# Patient Record
Sex: MALE | HISPANIC OR LATINO | Employment: FULL TIME | URBAN - METROPOLITAN AREA
[De-identification: names, ages, dates, MRNs, and addresses within clinical notes are randomized per-mention and may not be internally consistent; named-entity substitution may affect disease eponyms.]

---

## 2017-08-14 ENCOUNTER — HOSPITAL ENCOUNTER (EMERGENCY)
Facility: MEDICAL CENTER | Age: 37
End: 2017-08-14
Attending: EMERGENCY MEDICINE
Payer: COMMERCIAL

## 2017-08-14 VITALS
RESPIRATION RATE: 16 BRPM | DIASTOLIC BLOOD PRESSURE: 68 MMHG | SYSTOLIC BLOOD PRESSURE: 132 MMHG | OXYGEN SATURATION: 96 % | WEIGHT: 216.05 LBS | HEIGHT: 72 IN | TEMPERATURE: 96.8 F | HEART RATE: 78 BPM | BODY MASS INDEX: 29.26 KG/M2

## 2017-08-14 DIAGNOSIS — R42 LIGHT HEADEDNESS: ICD-10-CM

## 2017-08-14 LAB
ALBUMIN SERPL BCP-MCNC: 4.6 G/DL (ref 3.2–4.9)
ALBUMIN/GLOB SERPL: 1.3 G/DL
ALP SERPL-CCNC: 78 U/L (ref 30–99)
ALT SERPL-CCNC: 25 U/L (ref 2–50)
ANION GAP SERPL CALC-SCNC: 10 MMOL/L (ref 0–11.9)
AST SERPL-CCNC: 18 U/L (ref 12–45)
BASOPHILS # BLD AUTO: 0.3 % (ref 0–1.8)
BASOPHILS # BLD: 0.03 K/UL (ref 0–0.12)
BILIRUB SERPL-MCNC: 0.9 MG/DL (ref 0.1–1.5)
BUN SERPL-MCNC: 12 MG/DL (ref 8–22)
CALCIUM SERPL-MCNC: 9.8 MG/DL (ref 8.5–10.5)
CHLORIDE SERPL-SCNC: 105 MMOL/L (ref 96–112)
CO2 SERPL-SCNC: 23 MMOL/L (ref 20–33)
CREAT SERPL-MCNC: 0.98 MG/DL (ref 0.5–1.4)
EOSINOPHIL # BLD AUTO: 0.02 K/UL (ref 0–0.51)
EOSINOPHIL NFR BLD: 0.2 % (ref 0–6.9)
ERYTHROCYTE [DISTWIDTH] IN BLOOD BY AUTOMATED COUNT: 37.4 FL (ref 35.9–50)
GFR SERPL CREATININE-BSD FRML MDRD: >60 ML/MIN/1.73 M 2
GLOBULIN SER CALC-MCNC: 3.5 G/DL (ref 1.9–3.5)
GLUCOSE SERPL-MCNC: 97 MG/DL (ref 65–99)
HCT VFR BLD AUTO: 48.1 % (ref 42–52)
HGB BLD-MCNC: 16.8 G/DL (ref 14–18)
IMM GRANULOCYTES # BLD AUTO: 0.05 K/UL (ref 0–0.11)
IMM GRANULOCYTES NFR BLD AUTO: 0.4 % (ref 0–0.9)
LYMPHOCYTES # BLD AUTO: 3.18 K/UL (ref 1–4.8)
LYMPHOCYTES NFR BLD: 28 % (ref 22–41)
MCH RBC QN AUTO: 28 PG (ref 27–33)
MCHC RBC AUTO-ENTMCNC: 34.9 G/DL (ref 33.7–35.3)
MCV RBC AUTO: 80.3 FL (ref 81.4–97.8)
MONOCYTES # BLD AUTO: 0.77 K/UL (ref 0–0.85)
MONOCYTES NFR BLD AUTO: 6.8 % (ref 0–13.4)
NEUTROPHILS # BLD AUTO: 7.32 K/UL (ref 1.82–7.42)
NEUTROPHILS NFR BLD: 64.3 % (ref 44–72)
NRBC # BLD AUTO: 0 K/UL
NRBC BLD AUTO-RTO: 0 /100 WBC
PLATELET # BLD AUTO: 369 K/UL (ref 164–446)
PMV BLD AUTO: 9.8 FL (ref 9–12.9)
POTASSIUM SERPL-SCNC: 3.5 MMOL/L (ref 3.6–5.5)
PROT SERPL-MCNC: 8.1 G/DL (ref 6–8.2)
RBC # BLD AUTO: 5.99 M/UL (ref 4.7–6.1)
SODIUM SERPL-SCNC: 138 MMOL/L (ref 135–145)
TSH SERPL DL<=0.005 MIU/L-ACNC: 2.58 UIU/ML (ref 0.3–3.7)
WBC # BLD AUTO: 11.4 K/UL (ref 4.8–10.8)

## 2017-08-14 PROCEDURE — 84443 ASSAY THYROID STIM HORMONE: CPT

## 2017-08-14 PROCEDURE — 85025 COMPLETE CBC W/AUTO DIFF WBC: CPT

## 2017-08-14 PROCEDURE — 99283 EMERGENCY DEPT VISIT LOW MDM: CPT

## 2017-08-14 PROCEDURE — 80053 COMPREHEN METABOLIC PANEL: CPT

## 2017-08-14 ASSESSMENT — ENCOUNTER SYMPTOMS
EYES NEGATIVE: 1
CARDIOVASCULAR NEGATIVE: 1
DIZZINESS: 1
WEAKNESS: 1

## 2017-08-14 ASSESSMENT — PAIN SCALES - GENERAL: PAINLEVEL_OUTOF10: 2

## 2017-08-14 NOTE — ED AVS SNAPSHOT
8/14/2017    Florin Cox  [address] [address]    Dear Florin:    [facility] wants to ensure your discharge home is safe and you or your loved ones have had all of your questions answered regarding your care after you leave the hospital.    Below is a list of resources and contact information should you have any questions regarding your hospital stay, follow-up instructions, or active medical symptoms.    Questions or Concerns Regarding… Contact   Medical Questions Related to Your Discharge  (7 days a week, 8am-5pm) Contact a Nurse Care Coordinator   [phone]   Medical Questions Not Related to Your Discharge  (24 hours a day / 7 days a week)  Contact the Nurse Health Line   [phone]    Medications or Discharge Instructions Refer to your discharge packet   or contact your [facility] Primary Care Provider   [phone]   Follow-up Appointment(s) Schedule your appointment via Fjuul   or contact Scheduling [phone]   Billing Review your statement via Fjuul  or contact Billing [phone]   Medical Records Review your records via Fjuul   or contact Medical Records [phone]     You may receive a telephone call within two days of discharge. This call is to make certain you understand your discharge instructions and have the opportunity to have any questions answered. You can also easily access your medical information, test results and upcoming appointments via the Fjuul free online health management tool. You can learn more and sign up at [employer]/Fjuul. For assistance setting up your Fjuul account, please call [phone].    Once again, we want to ensure your discharge home is safe and that you have a clear understanding of any next steps in your care. If you have any questions or concerns, please do not hesitate to contact us, we are here for you. Thank you for choosing [facility] for your healthcare needs.    Sincerely,    Your [facility] Healthcare Team

## 2017-08-14 NOTE — ED AVS SNAPSHOT
PedidosYa / PedidosJÃ¡ Access Code: [email or web address]  Expires: 9/13/2017  8:54 PM    Your email address is not on file at [facility].  Email Addresses are required for you to sign up for PedidosYa / PedidosJÃ¡, please contact [phone] to verify your personal information and to provide your email address prior to attempting to register for PedidosYa / PedidosJÃ¡.    Florin Zhaomargaret Cox  [address] [place] [address]    PedidosYa / PedidosJÃ¡  A secure, online tool to manage your health information     [facility]’s PedidosYa / PedidosJÃ¡® is a secure, online tool that connects you to your personalized health information from the privacy of your home -- day or night - making it very easy for you to manage your healthcare. Once the activation process is completed, you can even access your medical information using the PedidosYa / PedidosJÃ¡ erick, which is available for free in the Apple Erick store or Google Play store.     To learn more about PedidosYa / PedidosJÃ¡, visit [email or web address]    There are two levels of access available (as shown below):   My Chart Features  [facility] [place] Primary Care Doctor [facility]  Specialists [facility]  Urgent  Care Non-[facility] Primary Care Doctor   Email your healthcare team securely and privately 24/7 X X X    Manage appointments: schedule your next appointment; view details of past/upcoming appointments X      Request prescription refills. X      View recent personal medical records, including lab and immunizations X X X X   View health record, including health history, allergies, medications X X X X   Read reports about your outpatient visits, procedures, consult and ER notes X X X X   See your discharge summary, which is a recap of your hospital and/or ER visit that includes your diagnosis, lab results, and care plan X X  X     How to register for PedidosYa / PedidosJÃ¡:  Once your e-mail address has been verified, follow the following steps to sign up for PedidosYa / PedidosJÃ¡.     1. Go to  [email or web address]  2. Click on the Sign Up Now box, which takes you to the New Member Sign Up page. You  will need to provide the following information:  a. Enter your Hoseanna Access Code exactly as it appears at the top of this page. (You will not need to use this code after you’ve completed the sign-up process. If you do not sign up before the expiration date, you must request a new code.)   b. Enter your date of birth.   c. Enter your home email address.   d. Click Submit, and follow the next screen’s instructions.  3. Create a Quantifindt ID. This will be your Hoseanna login ID and cannot be changed, so think of one that is secure and easy to remember.  4. Create a Hoseanna password. You can change your password at any time.  5. Enter your Password Reset Question and Answer. This can be used at a later time if you forget your password.   6. Enter your e-mail address. This allows you to receive e-mail notifications when new information is available in Hoseanna.  7. Click Sign Up. You can now view your health information.    For assistance activating your Hoseanna account, call [phone]

## 2017-08-14 NOTE — ED AVS SNAPSHOT
Home Care Instructions                                                                                                                Florin Cox   MRN: [id number]    Department:  [facility], Emergency Dept   Date of Visit:  8/14/2017            [facility], Emergency Dept    [address]    [place] [address]    Phone:  [phone]      You were seen by     Tom Brady M.D.      Your Diagnosis Was     Light headedness     R42       Follow-up Information     1. Schedule an appointment as soon as possible for a visit with Your primary care doctor.      Medication Information     Review all of your home medications and newly ordered medications with your primary doctor and/or pharmacist as soon as possible. Follow medication instructions as directed by your doctor and/or pharmacist.     Please keep your complete medication list with you and share with your physician. Update the information when medications are discontinued, doses are changed, or new medications (including over-the-counter products) are added; and carry medication information at all times in the event of emergency situations.               Medication List      ASK your doctor about these medications        Instructions    Morning Afternoon Evening Bedtime    oxycodone-acetaminophen 7.5-325 MG per tablet   Commonly known as:  PERCOCET        Take 1 Tab by mouth every four hours as needed.   Dose:  1 Tab                                Procedures and tests performed during your visit     CBC WITH DIFFERENTIAL    COMP METABOLIC PANEL    ESTIMATED GFR    TSH        Discharge Instructions       Your labs are normal. Currently I believe her symptoms may be from low blood sugar in the morning. Please start him morning with a healthy meal. Her symptoms continue to persist despite this intervention please follow up with her primary care doctor for ongoing workup.          Dizziness  Dizziness is a common  problem. It makes you feel unsteady or lightheaded. You may feel like you are about to pass out (faint). Dizziness can lead to injury if you stumble or fall. Anyone can get dizzy, but dizziness is more common in older adults. This condition can be caused by a number of things, including:  · Medicines.  · Dehydration.  · Illness.  HOME CARE  Following these instructions may help with your condition:  Eating and Drinking  · Drink enough fluid to keep your pee (urine) clear or pale yellow. This helps to keep you from getting dehydrated. Try to drink more clear fluids, such as water.  · Do not drink alcohol.  · Limit how much caffeine you drink or eat if told by your doctor.  · Limit how much salt you drink or eat if told by your doctor.  Activity  · Avoid making quick movements.  ¨ When you stand up from sitting in a chair, steady yourself until you feel okay.  ¨ In the morning, first sit up on the side of the bed. When you feel okay, stand slowly while you hold onto something. Do this until you know that your balance is fine.  · Move your legs often if you need to  one place for a long time. Tighten and relax your muscles in your legs while you are standing.  · Do not drive or use heavy machinery if you feel dizzy.  · Avoid bending down if you feel dizzy. Place items in your home so that they are easy for you to reach without leaning over.  Lifestyle  · Do not use any tobacco products, including cigarettes, chewing tobacco, or electronic cigarettes. If you need help quitting, ask your doctor.  · Try to lower your stress level, such as with yoga or meditation. Talk with your doctor if you need help.  General Instructions  · Watch your dizziness for any changes.  · Take medicines only as told by your doctor. Talk with your doctor if you think that your dizziness is caused by a medicine that you are taking.  · Tell a friend or a family member that you are feeling dizzy. If he or she notices any changes in your  behavior, have this person call your doctor.  · Keep all follow-up visits as told by your doctor. This is important.  GET HELP IF:  · Your dizziness does not go away.  · Your dizziness or light-headedness gets worse.  · You feel sick to your stomach (nauseous).  · You have trouble hearing.  · You have new symptoms.  · You are unsteady on your feet or you feel like the room is spinning.  GET HELP RIGHT AWAY IF:  · You throw up (vomit) or have diarrhea and are unable to eat or drink anything.  · You have trouble:  ¨ Talking.  ¨ Walking.  ¨ Swallowing.  ¨ Using your arms, hands, or legs.  · You feel generally weak.  · You are not thinking clearly or you have trouble forming sentences. It may take a friend or family member to notice this.  · You have:  ¨ Chest pain.  ¨ Pain in your belly (abdomen).  ¨ Shortness of breath.  ¨ Sweating.  · Your vision changes.  · You are bleeding.  · You have a headache.  · You have neck pain or a stiff neck.  · You have a fever.     This information is not intended to replace advice given to you by your health care provider. Make sure you discuss any questions you have with your health care provider.     Document Released: 12/06/2012 Document Revised: 05/03/2016 Document Reviewed: 12/14/2015  [employer] Interactive Patient Education ©2016 [employer]            Patient Information     Patient Information    Following emergency treatment: all patient requiring follow-up care must return either to a private physician or a clinic if your condition worsens before you are able to obtain further medical attention, please return to the emergency room.     Billing Information    At [facility], we work to make the billing process streamlined for our patients.  Our Representatives are here to answer any questions you may have regarding your hospital bill.  If you have insurance coverage and have supplied your insurance information to us, we will submit a claim to your insurer on your behalf.   Should you have any questions regarding your bill, we can be reached online or by phone as follows:  Online: You are able pay your bills online or live chat with our representatives about any billing questions you may have. We are here to help Monday - Friday from 8:00am to 7:30pm and 9:00am - 12:00pm on Saturdays.  Please visit [email or web address]  for more information.   Phone:  [phone] or [phone]    Please note that your emergency physician, surgeon, pathologist, radiologist, anesthesiologist, and other specialists are not employed by [facility] and will therefore bill separately for their services.  Please contact them directly for any questions concerning their bills at the numbers below:     Emergency Physician Services:  [phone]  [facility]:  [phone]  [facility]:  [phone]  [facility]:  [phone]    1. Your final bill may vary from the amount quoted upon discharge if all procedures are not complete at that time, or if your doctor has additional procedures of which we are not aware. You will receive an additional bill if you return to the Emergency Department at [facility] for suture removal regardless of the facility of which the sutures were placed.     2. Please arrange for settlement of this account at the emergency registration.    3. All self-pay accounts are due in full at the time of treatment.  If you are unable to meet this obligation then payment is expected within 4-5 days.     4. If you have had radiology studies (CT, X-ray, Ultrasound, MRI), you have received a preliminary result during your emergency department visit. Please contact the radiology department [phone] to receive a copy of your final result. Please discuss the Final result with your primary physician or with the follow up physician provided.     Crisis Hotline:  National Crisis Hotline:  3-987-JAYYVHD or  1-527.784.5736  [place] Crisis Hotline:    1-916.292.5459 or 515-863-6342         ED Discharge Follow Up Questions    1. In order to provide you with very good care, we would like to follow up with a phone call in the next few days.  May we have your permission to contact you?     YES /  NO    2. What is the best phone number to call you? (       )_____-__________    3. What is the best time to call you?      Morning  /  Afternoon  /  Evening                   Patient Signature:  ____________________________________________________________    Date:  ____________________________________________________________

## 2017-08-15 NOTE — ED PROVIDER NOTES
"ED Provider Note    Scribed for Tom Brady M.D. by Korina Wetzel. 8/14/2017  7:38 PM    Means of arrival: Walkin  History obtained from: patient  Limitations: none    CHIEF COMPLAINT  Chief Complaint   Patient presents with   • Dizziness     \"I feel like the room is spinning.\"   • Weakness     worse in the morning when the dizziness starts, gets better after the day goes on       HPI  Florin Cox is a 36 y.o. male who presents for lightheadedness. Patient reports that he has felt lightheaded for the last few months. Patient reports that this is worse in the mornings and usually baseline today. Patient reports that the duration of his lightheadedness has increased recently to a couple hours. Patient denies any difficulty ambulating. Patient denies any feelings of the room spinning or feeling like he is drunk. Patient denies any sensation of feeling like he is going to pass out. Patient denies any associated chest pain shortness of breath or decreased exertional capacity. Patient reports he can walk as far as like without any provocation of symptoms. Patient reports he does not eat any breakfast and usually the symptoms leanne throughout the day as he eats. Patient denies any headaches changes in vision. Patient does report he is having a little bit more hair fall out in the shower recently. Denies any heat or cold intolerance. Patient denies any abdominal pain. Patient does report some mild nausea upon waking which resolves shortly thereafter. Patient denies any focal weakness or numbness but does report when he is feeling this lightheadedness but he does feel diffusely \"out of it\" or weak. He reports he can do his job without any difficulty throughout the duration of his symptoms.    REVIEW OF SYSTEMS  Review of Systems   HENT: Negative.    Eyes: Negative.    Cardiovascular: Negative.    Skin: Negative.    Neurological: Positive for dizziness and weakness.   See HPI for further details.  E    PAST " "MEDICAL HISTORY  None    SOCIAL HISTORY  Social History     Social History Main Topics   • Smoking status: Never Smoker    • Alcohol Use: No   • Drug Use: No       SURGICAL HISTORY   has past surgical history that includes other abdominal surgery.    CURRENT MEDICATIONS  None    ALLERGIES  No Known Allergies    PHYSICAL EXAM  /72 mmHg  Pulse 83  Temp(Src) 36 °C (96.8 °F)  Resp 16  Ht 1.829 m (6' 0.01\")  Wt 98 kg (216 lb 0.8 oz)  BMI 29.30 kg/m2  SpO2 96%  Constitutional: Well developed, Well nourished, No acute distress, Non-toxic appearance.   HENT: Normocephalic, Atraumatic,  Eyes: EOM intact, PERRL  Neck: normal ROM  Cardiovascular: Regular rate and rhythm  Lungs: Clear to auscultation bilaterally, easy unlabored respirations   Abdomen: Bowel sounds normal, Soft, No tenderness  Skin: Warm, Dry, no rash  Extremities: No edema to lower extremities  Neurologic: Alert and oriented, appropriate, follows commands, moving all extremities, normal speech. Patient with normal gait. Patient with sensation intact. Patient with Romberg's negative. Patient without any dysmetria or dysdiadochokinesia. Patient without any nystagmus.  Psychiatric: Affect normal    DIAGNOSTIC STUDIES/PROCEDURES  LABS  No results found for this or any previous visit.    COURSE & MEDICAL DECISION MAKING  Pertinent Labs & Imaging studies reviewed. (See chart for details)    7:38 PM Patient seen and examined at bedside. The patient presents with vague light headedness. Basic labs ordered to evaluate.     Decision Making:  The patient will return for new or worsening symptoms and is stable at the time of discharge.  This well-appearing patient presents with vague lightheadedness that abates throughout the day after he eats. Such symptoms would be consistent with mild preprandial hypoglycemia. Patient has entirely normal neurologic exam, however I have discussed the patient that if workup today is normal and symptoms do not improve with " introducing an early meal that he should follow-up with primary care doctor for ongoing evaluation. Patient without any associated chest pain, shortness of breath, decreased exertional capacity, near syncope or any other symptoms to suggest ACS or cardiopulmonary sxs    The patient is referred to a primary physician for blood pressure management, diabetic screening, and for all other preventative health concerns.    Minimally decreased potassium. I discussed patient to take a daily vitamin and start the day with a healthy breakfast. He understands to return to the emergency department or to his primary care doctor if this intervention does not quell his symptoms.    DISPOSITION:  Patient will be discharged home in stable condition.    FOLLOW UP:  No follow-up provider specified.    OUTPATIENT MEDICATIONS:  New Prescriptions    No medications on file     FINAL IMPRESSION  No diagnosis found.     Korina SHARIF (Catie), am scribing for, and in the presence of, Tom Brady M.D.    Electronically signed by: Korina Wetzel (Catie), 8/14/2017    Tom SHARIF M.D. personally performed the services described in this documentation, as scribed by Korina Wetzel in my presence, and it is both accurate and complete.    The note accurately reflects work and decisions made by me.  Tom Brady  8/14/2017  8:54 PM

## 2017-08-15 NOTE — ED NOTES
"Pt amb to triage.  Chief Complaint   Patient presents with   • Dizziness     \"I feel like the room is spinning.\"   • Weakness     worse in the morning when the dizziness starts, gets better after the day goes on     Symptoms xmonths.  "

## 2017-08-15 NOTE — DISCHARGE PLANNING
According to CONNIE arcos he is not planning on admitting pt ([employer] insurance). ERCM spoke with pt and told him that with his [employer] insurance his hospital of choice is [facility], especially if he were to be an admit his copays/deductible could be more than expected, pt acknowledged info given.

## 2017-08-15 NOTE — DISCHARGE INSTRUCTIONS
Your labs are normal. Currently I believe her symptoms may be from low blood sugar in the morning. Please start him morning with a healthy meal. Her symptoms continue to persist despite this intervention please follow up with her primary care doctor for ongoing workup.      Please follow up with a primary physician for blood pressure management, diabetic screening, and all other preventive health concerns.       Dizziness  Dizziness is a common problem. It makes you feel unsteady or lightheaded. You may feel like you are about to pass out (faint). Dizziness can lead to injury if you stumble or fall. Anyone can get dizzy, but dizziness is more common in older adults. This condition can be caused by a number of things, including:  · Medicines.  · Dehydration.  · Illness.  HOME CARE  Following these instructions may help with your condition:  Eating and Drinking  · Drink enough fluid to keep your pee (urine) clear or pale yellow. This helps to keep you from getting dehydrated. Try to drink more clear fluids, such as water.  · Do not drink alcohol.  · Limit how much caffeine you drink or eat if told by your doctor.  · Limit how much salt you drink or eat if told by your doctor.  Activity  · Avoid making quick movements.  ¨ When you stand up from sitting in a chair, steady yourself until you feel okay.  ¨ In the morning, first sit up on the side of the bed. When you feel okay, stand slowly while you hold onto something. Do this until you know that your balance is fine.  · Move your legs often if you need to  one place for a long time. Tighten and relax your muscles in your legs while you are standing.  · Do not drive or use heavy machinery if you feel dizzy.  · Avoid bending down if you feel dizzy. Place items in your home so that they are easy for you to reach without leaning over.  Lifestyle  · Do not use any tobacco products, including cigarettes, chewing tobacco, or electronic cigarettes. If you need help  quitting, ask your doctor.  · Try to lower your stress level, such as with yoga or meditation. Talk with your doctor if you need help.  General Instructions  · Watch your dizziness for any changes.  · Take medicines only as told by your doctor. Talk with your doctor if you think that your dizziness is caused by a medicine that you are taking.  · Tell a friend or a family member that you are feeling dizzy. If he or she notices any changes in your behavior, have this person call your doctor.  · Keep all follow-up visits as told by your doctor. This is important.  GET HELP IF:  · Your dizziness does not go away.  · Your dizziness or light-headedness gets worse.  · You feel sick to your stomach (nauseous).  · You have trouble hearing.  · You have new symptoms.  · You are unsteady on your feet or you feel like the room is spinning.  GET HELP RIGHT AWAY IF:  · You throw up (vomit) or have diarrhea and are unable to eat or drink anything.  · You have trouble:  ¨ Talking.  ¨ Walking.  ¨ Swallowing.  ¨ Using your arms, hands, or legs.  · You feel generally weak.  · You are not thinking clearly or you have trouble forming sentences. It may take a friend or family member to notice this.  · You have:  ¨ Chest pain.  ¨ Pain in your belly (abdomen).  ¨ Shortness of breath.  ¨ Sweating.  · Your vision changes.  · You are bleeding.  · You have a headache.  · You have neck pain or a stiff neck.  · You have a fever.     This information is not intended to replace advice given to you by your health care provider. Make sure you discuss any questions you have with your health care provider.     Document Released: 12/06/2012 Document Revised: 05/03/2016 Document Reviewed: 12/14/2015  [employer] Interactive Patient Education ©2016 [employer]

## 2021-09-05 ENCOUNTER — APPOINTMENT (OUTPATIENT)
Dept: RADIOLOGY | Facility: MEDICAL CENTER | Age: 41
DRG: 177 | End: 2021-09-05
Attending: EMERGENCY MEDICINE
Payer: MEDICAID

## 2021-09-05 ENCOUNTER — HOSPITAL ENCOUNTER (INPATIENT)
Facility: MEDICAL CENTER | Age: 41
LOS: 2 days | DRG: 177 | End: 2021-09-07
Attending: EMERGENCY MEDICINE | Admitting: STUDENT IN AN ORGANIZED HEALTH CARE EDUCATION/TRAINING PROGRAM
Payer: MEDICAID

## 2021-09-05 DIAGNOSIS — U07.1 PNEUMONIA DUE TO COVID-19 VIRUS: ICD-10-CM

## 2021-09-05 DIAGNOSIS — U07.1 COVID-19: ICD-10-CM

## 2021-09-05 DIAGNOSIS — R09.02 HYPOXIA: ICD-10-CM

## 2021-09-05 DIAGNOSIS — J12.82 PNEUMONIA DUE TO COVID-19 VIRUS: ICD-10-CM

## 2021-09-05 LAB
ALBUMIN SERPL BCP-MCNC: 4.1 G/DL (ref 3.2–4.9)
ALBUMIN/GLOB SERPL: 1 G/DL
ALP SERPL-CCNC: 151 U/L (ref 30–99)
ALT SERPL-CCNC: 30 U/L (ref 2–50)
ANION GAP SERPL CALC-SCNC: 16 MMOL/L (ref 7–16)
AST SERPL-CCNC: 37 U/L (ref 12–45)
BASOPHILS # BLD AUTO: 0.1 % (ref 0–1.8)
BASOPHILS # BLD: 0.01 K/UL (ref 0–0.12)
BILIRUB SERPL-MCNC: 0.4 MG/DL (ref 0.1–1.5)
BUN SERPL-MCNC: 11 MG/DL (ref 8–22)
CALCIUM SERPL-MCNC: 9 MG/DL (ref 8.5–10.5)
CHLORIDE SERPL-SCNC: 96 MMOL/L (ref 96–112)
CO2 SERPL-SCNC: 21 MMOL/L (ref 20–33)
CREAT SERPL-MCNC: 0.99 MG/DL (ref 0.5–1.4)
EKG IMPRESSION: NORMAL
EOSINOPHIL # BLD AUTO: 0 K/UL (ref 0–0.51)
EOSINOPHIL NFR BLD: 0 % (ref 0–6.9)
ERYTHROCYTE [DISTWIDTH] IN BLOOD BY AUTOMATED COUNT: 36.9 FL (ref 35.9–50)
GLOBULIN SER CALC-MCNC: 4 G/DL (ref 1.9–3.5)
GLUCOSE SERPL-MCNC: 131 MG/DL (ref 65–99)
HCT VFR BLD AUTO: 47.9 % (ref 42–52)
HGB BLD-MCNC: 16.7 G/DL (ref 14–18)
IMM GRANULOCYTES # BLD AUTO: 0.05 K/UL (ref 0–0.11)
IMM GRANULOCYTES NFR BLD AUTO: 0.5 % (ref 0–0.9)
LACTATE BLD-SCNC: 1.6 MMOL/L (ref 0.5–2)
LYMPHOCYTES # BLD AUTO: 1.56 K/UL (ref 1–4.8)
LYMPHOCYTES NFR BLD: 16.3 % (ref 22–41)
MCH RBC QN AUTO: 27.8 PG (ref 27–33)
MCHC RBC AUTO-ENTMCNC: 34.9 G/DL (ref 33.7–35.3)
MCV RBC AUTO: 79.7 FL (ref 81.4–97.8)
MONOCYTES # BLD AUTO: 0.4 K/UL (ref 0–0.85)
MONOCYTES NFR BLD AUTO: 4.2 % (ref 0–13.4)
NEUTROPHILS # BLD AUTO: 7.54 K/UL (ref 1.82–7.42)
NEUTROPHILS NFR BLD: 78.9 % (ref 44–72)
NRBC # BLD AUTO: 0 K/UL
NRBC BLD-RTO: 0 /100 WBC
PLATELET # BLD AUTO: 252 K/UL (ref 164–446)
PMV BLD AUTO: 9.8 FL (ref 9–12.9)
POTASSIUM SERPL-SCNC: 3.7 MMOL/L (ref 3.6–5.5)
PROT SERPL-MCNC: 8.1 G/DL (ref 6–8.2)
RBC # BLD AUTO: 6.01 M/UL (ref 4.7–6.1)
SODIUM SERPL-SCNC: 133 MMOL/L (ref 135–145)
WBC # BLD AUTO: 9.6 K/UL (ref 4.8–10.8)

## 2021-09-05 PROCEDURE — 700111 HCHG RX REV CODE 636 W/ 250 OVERRIDE (IP): Performed by: STUDENT IN AN ORGANIZED HEALTH CARE EDUCATION/TRAINING PROGRAM

## 2021-09-05 PROCEDURE — 83605 ASSAY OF LACTIC ACID: CPT

## 2021-09-05 PROCEDURE — 0241U HCHG SARS-COV-2 COVID-19 NFCT DS RESP RNA 4 TRGT MIC: CPT

## 2021-09-05 PROCEDURE — 80053 COMPREHEN METABOLIC PANEL: CPT

## 2021-09-05 PROCEDURE — 99223 1ST HOSP IP/OBS HIGH 75: CPT | Performed by: STUDENT IN AN ORGANIZED HEALTH CARE EDUCATION/TRAINING PROGRAM

## 2021-09-05 PROCEDURE — 36415 COLL VENOUS BLD VENIPUNCTURE: CPT

## 2021-09-05 PROCEDURE — 93005 ELECTROCARDIOGRAM TRACING: CPT

## 2021-09-05 PROCEDURE — 96374 THER/PROPH/DIAG INJ IV PUSH: CPT

## 2021-09-05 PROCEDURE — 87040 BLOOD CULTURE FOR BACTERIA: CPT

## 2021-09-05 PROCEDURE — 99285 EMERGENCY DEPT VISIT HI MDM: CPT

## 2021-09-05 PROCEDURE — C9803 HOPD COVID-19 SPEC COLLECT: HCPCS | Performed by: EMERGENCY MEDICINE

## 2021-09-05 PROCEDURE — 770006 HCHG ROOM/CARE - MED/SURG/GYN SEMI*

## 2021-09-05 PROCEDURE — 96375 TX/PRO/DX INJ NEW DRUG ADDON: CPT

## 2021-09-05 PROCEDURE — 700111 HCHG RX REV CODE 636 W/ 250 OVERRIDE (IP): Performed by: EMERGENCY MEDICINE

## 2021-09-05 PROCEDURE — 71045 X-RAY EXAM CHEST 1 VIEW: CPT

## 2021-09-05 PROCEDURE — 93005 ELECTROCARDIOGRAM TRACING: CPT | Performed by: EMERGENCY MEDICINE

## 2021-09-05 PROCEDURE — 85025 COMPLETE CBC W/AUTO DIFF WBC: CPT

## 2021-09-05 RX ORDER — DEXAMETHASONE SODIUM PHOSPHATE 4 MG/ML
6 INJECTION, SOLUTION INTRA-ARTICULAR; INTRALESIONAL; INTRAMUSCULAR; INTRAVENOUS; SOFT TISSUE ONCE
Status: COMPLETED | OUTPATIENT
Start: 2021-09-05 | End: 2021-09-05

## 2021-09-05 RX ORDER — BISACODYL 10 MG
10 SUPPOSITORY, RECTAL RECTAL
Status: DISCONTINUED | OUTPATIENT
Start: 2021-09-05 | End: 2021-09-07 | Stop reason: HOSPADM

## 2021-09-05 RX ORDER — GUAIFENESIN 600 MG/1
600 TABLET, EXTENDED RELEASE ORAL 2 TIMES DAILY PRN
Status: DISCONTINUED | OUTPATIENT
Start: 2021-09-05 | End: 2021-09-07 | Stop reason: HOSPADM

## 2021-09-05 RX ORDER — POLYETHYLENE GLYCOL 3350 17 G/17G
1 POWDER, FOR SOLUTION ORAL
Status: DISCONTINUED | OUTPATIENT
Start: 2021-09-05 | End: 2021-09-07 | Stop reason: HOSPADM

## 2021-09-05 RX ORDER — ACETAMINOPHEN 325 MG/1
650 TABLET ORAL EVERY 6 HOURS PRN
COMMUNITY

## 2021-09-05 RX ORDER — ONDANSETRON 2 MG/ML
4 INJECTION INTRAMUSCULAR; INTRAVENOUS ONCE
Status: COMPLETED | OUTPATIENT
Start: 2021-09-05 | End: 2021-09-05

## 2021-09-05 RX ORDER — DEXAMETHASONE SODIUM PHOSPHATE 4 MG/ML
6 INJECTION, SOLUTION INTRA-ARTICULAR; INTRALESIONAL; INTRAMUSCULAR; INTRAVENOUS; SOFT TISSUE EVERY 6 HOURS
Status: DISCONTINUED | OUTPATIENT
Start: 2021-09-05 | End: 2021-09-05

## 2021-09-05 RX ORDER — ACETAMINOPHEN 325 MG/1
650 TABLET ORAL EVERY 6 HOURS PRN
Status: DISCONTINUED | OUTPATIENT
Start: 2021-09-05 | End: 2021-09-07 | Stop reason: HOSPADM

## 2021-09-05 RX ORDER — AMOXICILLIN 250 MG
2 CAPSULE ORAL 2 TIMES DAILY
Status: DISCONTINUED | OUTPATIENT
Start: 2021-09-05 | End: 2021-09-07 | Stop reason: HOSPADM

## 2021-09-05 RX ORDER — DEXAMETHASONE 6 MG/1
6 TABLET ORAL DAILY
Status: DISCONTINUED | OUTPATIENT
Start: 2021-09-06 | End: 2021-09-07 | Stop reason: HOSPADM

## 2021-09-05 RX ADMIN — DEXAMETHASONE SODIUM PHOSPHATE 6 MG: 4 INJECTION, SOLUTION INTRA-ARTICULAR; INTRALESIONAL; INTRAMUSCULAR; INTRAVENOUS; SOFT TISSUE at 23:32

## 2021-09-05 RX ADMIN — ONDANSETRON 4 MG: 2 INJECTION INTRAMUSCULAR; INTRAVENOUS at 21:38

## 2021-09-06 PROBLEM — Z78.9 FULL CODE STATUS: Status: ACTIVE | Noted: 2021-09-06

## 2021-09-06 PROBLEM — E87.1 HYPONATREMIA: Status: ACTIVE | Noted: 2021-09-06

## 2021-09-06 PROBLEM — Z53.1 REFUSAL OF BLOOD TRANSFUSIONS AS PATIENT IS JEHOVAH'S WITNESS: Status: ACTIVE | Noted: 2021-09-06

## 2021-09-06 PROBLEM — J96.00 ACUTE RESPIRATORY FAILURE DUE TO COVID-19 (HCC): Status: ACTIVE | Noted: 2021-09-06

## 2021-09-06 PROBLEM — U07.1 COVID-19: Status: ACTIVE | Noted: 2021-09-06

## 2021-09-06 PROBLEM — U07.1 ACUTE RESPIRATORY FAILURE DUE TO COVID-19 (HCC): Status: ACTIVE | Noted: 2021-09-06

## 2021-09-06 LAB
ALBUMIN SERPL BCP-MCNC: 3.8 G/DL (ref 3.2–4.9)
ALBUMIN/GLOB SERPL: 1 G/DL
ALP SERPL-CCNC: 138 U/L (ref 30–99)
ALT SERPL-CCNC: 30 U/L (ref 2–50)
ANION GAP SERPL CALC-SCNC: 15 MMOL/L (ref 7–16)
AST SERPL-CCNC: 33 U/L (ref 12–45)
BASOPHILS # BLD AUTO: 0 % (ref 0–1.8)
BASOPHILS # BLD: 0 K/UL (ref 0–0.12)
BILIRUB SERPL-MCNC: 0.4 MG/DL (ref 0.1–1.5)
BUN SERPL-MCNC: 13 MG/DL (ref 8–22)
BURR CELLS BLD QL SMEAR: NORMAL
CALCIUM SERPL-MCNC: 8.7 MG/DL (ref 8.5–10.5)
CHLORIDE SERPL-SCNC: 99 MMOL/L (ref 96–112)
CO2 SERPL-SCNC: 23 MMOL/L (ref 20–33)
CREAT SERPL-MCNC: 1.08 MG/DL (ref 0.5–1.4)
CREAT UR-MCNC: 197.6 MG/DL
D DIMER PPP IA.FEU-MCNC: 0.45 UG/ML (FEU) (ref 0–0.5)
EOSINOPHIL # BLD AUTO: 0 K/UL (ref 0–0.51)
EOSINOPHIL NFR BLD: 0 % (ref 0–6.9)
ERYTHROCYTE [DISTWIDTH] IN BLOOD BY AUTOMATED COUNT: 37.8 FL (ref 35.9–50)
EST. AVERAGE GLUCOSE BLD GHB EST-MCNC: 126 MG/DL
FERRITIN SERPL-MCNC: 378 NG/ML (ref 22–322)
FLUAV RNA SPEC QL NAA+PROBE: NEGATIVE
FLUBV RNA SPEC QL NAA+PROBE: NEGATIVE
GLOBULIN SER CALC-MCNC: 3.8 G/DL (ref 1.9–3.5)
GLUCOSE SERPL-MCNC: 170 MG/DL (ref 65–99)
HBA1C MFR BLD: 6 % (ref 4–5.6)
HCT VFR BLD AUTO: 47.4 % (ref 42–52)
HGB BLD-MCNC: 16.4 G/DL (ref 14–18)
IRON SATN MFR SERPL: 11 % (ref 15–55)
IRON SERPL-MCNC: 27 UG/DL (ref 50–180)
LYMPHOCYTES # BLD AUTO: 0.96 K/UL (ref 1–4.8)
LYMPHOCYTES NFR BLD: 12.2 % (ref 22–41)
MAGNESIUM SERPL-MCNC: 2.2 MG/DL (ref 1.5–2.5)
MANUAL DIFF BLD: NORMAL
MCH RBC QN AUTO: 28.2 PG (ref 27–33)
MCHC RBC AUTO-ENTMCNC: 34.6 G/DL (ref 33.7–35.3)
MCV RBC AUTO: 81.4 FL (ref 81.4–97.8)
MONOCYTES # BLD AUTO: 0.07 K/UL (ref 0–0.85)
MONOCYTES NFR BLD AUTO: 0.9 % (ref 0–13.4)
MORPHOLOGY BLD-IMP: NORMAL
NEUTROPHILS # BLD AUTO: 6.87 K/UL (ref 1.82–7.42)
NEUTROPHILS NFR BLD: 86.9 % (ref 44–72)
NRBC # BLD AUTO: 0 K/UL
NRBC BLD-RTO: 0 /100 WBC
NT-PROBNP SERPL IA-MCNC: 24 PG/ML (ref 0–125)
OSMOLALITY SERPL: 286 MOSM/KG H2O (ref 278–298)
OSMOLALITY UR: 629 MOSM/KG H2O (ref 300–900)
PLATELET # BLD AUTO: 245 K/UL (ref 164–446)
PLATELET BLD QL SMEAR: NORMAL
PMV BLD AUTO: 10.1 FL (ref 9–12.9)
POIKILOCYTOSIS BLD QL SMEAR: NORMAL
POTASSIUM SERPL-SCNC: 4 MMOL/L (ref 3.6–5.5)
PROCALCITONIN SERPL-MCNC: <0.05 NG/ML
PROT SERPL-MCNC: 7.6 G/DL (ref 6–8.2)
RBC # BLD AUTO: 5.82 M/UL (ref 4.7–6.1)
RBC BLD AUTO: PRESENT
RSV RNA SPEC QL NAA+PROBE: NEGATIVE
SARS-COV-2 RNA RESP QL NAA+PROBE: DETECTED
SODIUM SERPL-SCNC: 137 MMOL/L (ref 135–145)
SODIUM UR-SCNC: 45 MMOL/L
SPECIMEN SOURCE: ABNORMAL
TIBC SERPL-MCNC: 256 UG/DL (ref 250–450)
UIBC SERPL-MCNC: 229 UG/DL (ref 110–370)
VIT B12 SERPL-MCNC: 1305 PG/ML (ref 211–911)
WBC # BLD AUTO: 7.9 K/UL (ref 4.8–10.8)

## 2021-09-06 PROCEDURE — 83935 ASSAY OF URINE OSMOLALITY: CPT

## 2021-09-06 PROCEDURE — 82607 VITAMIN B-12: CPT

## 2021-09-06 PROCEDURE — 36415 COLL VENOUS BLD VENIPUNCTURE: CPT

## 2021-09-06 PROCEDURE — 85007 BL SMEAR W/DIFF WBC COUNT: CPT

## 2021-09-06 PROCEDURE — 770006 HCHG ROOM/CARE - MED/SURG/GYN SEMI*

## 2021-09-06 PROCEDURE — 700102 HCHG RX REV CODE 250 W/ 637 OVERRIDE(OP): Performed by: STUDENT IN AN ORGANIZED HEALTH CARE EDUCATION/TRAINING PROGRAM

## 2021-09-06 PROCEDURE — 84145 PROCALCITONIN (PCT): CPT

## 2021-09-06 PROCEDURE — 80053 COMPREHEN METABOLIC PANEL: CPT

## 2021-09-06 PROCEDURE — 82570 ASSAY OF URINE CREATININE: CPT

## 2021-09-06 PROCEDURE — 85027 COMPLETE CBC AUTOMATED: CPT

## 2021-09-06 PROCEDURE — 83540 ASSAY OF IRON: CPT

## 2021-09-06 PROCEDURE — 84300 ASSAY OF URINE SODIUM: CPT

## 2021-09-06 PROCEDURE — 83735 ASSAY OF MAGNESIUM: CPT

## 2021-09-06 PROCEDURE — 83036 HEMOGLOBIN GLYCOSYLATED A1C: CPT

## 2021-09-06 PROCEDURE — 700111 HCHG RX REV CODE 636 W/ 250 OVERRIDE (IP): Performed by: STUDENT IN AN ORGANIZED HEALTH CARE EDUCATION/TRAINING PROGRAM

## 2021-09-06 PROCEDURE — A9270 NON-COVERED ITEM OR SERVICE: HCPCS | Performed by: STUDENT IN AN ORGANIZED HEALTH CARE EDUCATION/TRAINING PROGRAM

## 2021-09-06 PROCEDURE — 99233 SBSQ HOSP IP/OBS HIGH 50: CPT | Performed by: HOSPITALIST

## 2021-09-06 PROCEDURE — 83880 ASSAY OF NATRIURETIC PEPTIDE: CPT

## 2021-09-06 PROCEDURE — 83930 ASSAY OF BLOOD OSMOLALITY: CPT

## 2021-09-06 PROCEDURE — 96375 TX/PRO/DX INJ NEW DRUG ADDON: CPT

## 2021-09-06 PROCEDURE — 82728 ASSAY OF FERRITIN: CPT

## 2021-09-06 PROCEDURE — 96372 THER/PROPH/DIAG INJ SC/IM: CPT

## 2021-09-06 PROCEDURE — 83550 IRON BINDING TEST: CPT

## 2021-09-06 PROCEDURE — 85379 FIBRIN DEGRADATION QUANT: CPT

## 2021-09-06 RX ORDER — FUROSEMIDE 10 MG/ML
20 INJECTION INTRAMUSCULAR; INTRAVENOUS ONCE
Status: COMPLETED | OUTPATIENT
Start: 2021-09-06 | End: 2021-09-06

## 2021-09-06 RX ADMIN — ENOXAPARIN SODIUM 40 MG: 40 INJECTION SUBCUTANEOUS at 05:19

## 2021-09-06 RX ADMIN — DEXAMETHASONE 6 MG: 4 TABLET ORAL at 05:18

## 2021-09-06 RX ADMIN — FUROSEMIDE 20 MG: 10 INJECTION, SOLUTION INTRAMUSCULAR; INTRAVENOUS at 05:20

## 2021-09-06 RX ADMIN — GUAIFENESIN 600 MG: 600 TABLET, EXTENDED RELEASE ORAL at 05:30

## 2021-09-06 ASSESSMENT — COGNITIVE AND FUNCTIONAL STATUS - GENERAL
MOBILITY SCORE: 24
DAILY ACTIVITIY SCORE: 24
SUGGESTED CMS G CODE MODIFIER MOBILITY: CH
SUGGESTED CMS G CODE MODIFIER DAILY ACTIVITY: CH

## 2021-09-06 ASSESSMENT — ENCOUNTER SYMPTOMS
HEMOPTYSIS: 0
HEADACHES: 0
MYALGIAS: 0
CHILLS: 1
SORE THROAT: 0
BLOOD IN STOOL: 0
BLURRED VISION: 0
NAUSEA: 0
HEARTBURN: 0
DIZZINESS: 0
MYALGIAS: 1
ORTHOPNEA: 0
ABDOMINAL PAIN: 0
INSOMNIA: 1
VOMITING: 0
WEIGHT LOSS: 0
VOMITING: 1
FALLS: 0
DIARRHEA: 1
FEVER: 0
MEMORY LOSS: 0
NERVOUS/ANXIOUS: 0
FEVER: 1
SHORTNESS OF BREATH: 1
NAUSEA: 1
CONSTIPATION: 0
BRUISES/BLEEDS EASILY: 0
WEAKNESS: 1
CHILLS: 0
FOCAL WEAKNESS: 0
ABDOMINAL PAIN: 1
DIZZINESS: 1
PALPITATIONS: 0
NERVOUS/ANXIOUS: 1
HEADACHES: 1
SORE THROAT: 1
COUGH: 1
SPUTUM PRODUCTION: 0
DEPRESSION: 0
DOUBLE VISION: 0
HEMOPTYSIS: 1

## 2021-09-06 ASSESSMENT — LIFESTYLE VARIABLES
DOES PATIENT WANT TO STOP DRINKING: NO
HAVE PEOPLE ANNOYED YOU BY CRITICIZING YOUR DRINKING: NO
AVERAGE NUMBER OF DAYS PER WEEK YOU HAVE A DRINK CONTAINING ALCOHOL: 0
ON A TYPICAL DAY WHEN YOU DRINK ALCOHOL HOW MANY DRINKS DO YOU HAVE: 0
TOTAL SCORE: 0
ALCOHOL_USE: NO
CONSUMPTION TOTAL: NEGATIVE
TOTAL SCORE: 0
TOTAL SCORE: 0
HOW MANY TIMES IN THE PAST YEAR HAVE YOU HAD 5 OR MORE DRINKS IN A DAY: 0
SUBSTANCE_ABUSE: 0
EVER FELT BAD OR GUILTY ABOUT YOUR DRINKING: NO
EVER HAD A DRINK FIRST THING IN THE MORNING TO STEADY YOUR NERVES TO GET RID OF A HANGOVER: NO
HAVE YOU EVER FELT YOU SHOULD CUT DOWN ON YOUR DRINKING: NO

## 2021-09-06 ASSESSMENT — PATIENT HEALTH QUESTIONNAIRE - PHQ9
2. FEELING DOWN, DEPRESSED, IRRITABLE, OR HOPELESS: NOT AT ALL
1. LITTLE INTEREST OR PLEASURE IN DOING THINGS: NOT AT ALL
SUM OF ALL RESPONSES TO PHQ9 QUESTIONS 1 AND 2: 0

## 2021-09-06 ASSESSMENT — PAIN DESCRIPTION - PAIN TYPE
TYPE: ACUTE PAIN
TYPE: ACUTE PAIN

## 2021-09-06 NOTE — ASSESSMENT & PLAN NOTE
Patient presents with worsening hypoxia and tachypnea secondary to COVID-19 pneumonia.  Chest x-rays showing diffuse interstitial infiltrates consistent with COVID-19.    Provide supportive treatment for COVID-19 pneumonia including RT protocol, breathing treatment, Mucinex, Decadron   --Encourage self proning, continuing incentive spirometer

## 2021-09-06 NOTE — ED NOTES
Morning labs collected and sent. Medicated with morning meds and PRN cough meds. Updated pt that meds for cough available every 12 hours. Lasix explained to pt and additional urinal given to pt. Call light within reach. Will continue to monitor

## 2021-09-06 NOTE — H&P
Hospital Medicine History & Physical Note    Date of Service  9/5/2021    Primary Care Physician  Pcp Pt States None    Consultants    Code Status  Full Code    Chief Complaint  Chief Complaint   Patient presents with   • Shortness of Breath     symptoms started on 09/01, pt recieved the results and he is COVID POSITIVE. C/O of low appetite, cough, fevers, back pain.       History of Presenting Illness  Florin Cox is a 40 y.o. male who presented 9/5/2021 with worsening shortness of breath.  Patient was accompanied by his wife, who provided most of the history because patient was too ill to talk.  This is a pleasant gentleman with no significant past medical history, who started having symptoms 6 days ago, which consisted of loss of smell and taste, decreased appetite, cough, fevers, and body aches.  He also experienced some sore throat as well as abdominal pain, diarrhea, nausea, and vomiting.  He has been experiencing myalgias and joint pain with no significant exacerbating or alleviating factors.  He continues to have worsening dizziness as well as weakness and headaches.  He has been not able to sleep due to his symptoms.  Patient reports a dry cough, which is exacerbated with deep breathing.  He is unvaccinated.  The wife reports that their child initially got COVID-19 and then the wife got infected followed by the patient.    In the ER, patient was noted to become hypoxic down to 88% on room air requiring 2 LPM for SPO2 greater than 90%.  He was noted to be tachycardic up to 104 and persistently tachypneic with respiratory rate in the 20s to 30s.  Chest x-ray showed bilateral interstitial infiltrates consistent with COVID-19.  Patient requests not to be transfused if indicated because he believes in Mandaeism principles as per his parents.    I discussed the plan of care with patient and family.    Review of Systems  Review of Systems   Constitutional: Positive for chills, fever and  malaise/fatigue.   HENT: Positive for congestion and sore throat. Negative for ear pain.    Eyes: Negative for blurred vision and double vision.   Respiratory: Positive for cough, hemoptysis and shortness of breath. Negative for sputum production.    Cardiovascular: Negative for chest pain and palpitations.   Gastrointestinal: Positive for abdominal pain, diarrhea, nausea and vomiting. Negative for blood in stool and constipation.   Genitourinary: Negative for dysuria and frequency.   Musculoskeletal: Positive for joint pain and myalgias. Negative for falls.   Skin: Negative for itching and rash.   Neurological: Positive for dizziness, weakness and headaches. Negative for focal weakness.   Endo/Heme/Allergies: Negative for environmental allergies. Does not bruise/bleed easily.   Psychiatric/Behavioral: Negative for depression, memory loss and substance abuse. The patient has insomnia. The patient is not nervous/anxious.        Past Medical History  Denies any prior medical problems.    Surgical History   has a past surgical history that includes other abdominal surgery.     Family History  family history includes Hyperlipidemia in his father and mother; Hypertension in his father and mother.   Family history reviewed with patient. There is no family history that is pertinent to the chief complaint.     Social History   reports that he has never smoked. He has never used smokeless tobacco. He reports that he does not drink alcohol and does not use drugs.    Allergies  No Known Allergies    Medications  Prior to Admission Medications   Prescriptions Last Dose Informant Patient Reported? Taking?   acetaminophen (TYLENOL) 325 MG Tab 9/5/2021 at 1300 Patient Yes Yes   Sig: Take 650 mg by mouth every 6 hours as needed for Fever.      Facility-Administered Medications: None       Physical Exam  Temp:  [36.6 °C (97.9 °F)-37.8 °C (100.1 °F)] 37.8 °C (100.1 °F)  Pulse:  [] 85  Resp:  [21-32] 28  BP: (105-136)/(62-83)  105/62  SpO2:  [88 %-94 %] 88 %    Physical Exam  Vitals and nursing note reviewed.   Constitutional:       General: He is in acute distress.      Appearance: He is ill-appearing and toxic-appearing. He is not diaphoretic.   HENT:      Head: Normocephalic and atraumatic.      Right Ear: External ear normal.      Left Ear: External ear normal.      Nose: Nose normal.      Mouth/Throat:      Mouth: Mucous membranes are moist.      Pharynx: No oropharyngeal exudate or posterior oropharyngeal erythema.   Eyes:      General:         Right eye: No discharge.         Left eye: No discharge.      Conjunctiva/sclera: Conjunctivae normal.      Pupils: Pupils are equal, round, and reactive to light.   Cardiovascular:      Rate and Rhythm: Normal rate and regular rhythm.      Pulses: Normal pulses.      Heart sounds: Normal heart sounds. No murmur heard.   No gallop.    Pulmonary:      Effort: Respiratory distress present.      Breath sounds: No wheezing, rhonchi or rales.   Abdominal:      General: Abdomen is flat. Bowel sounds are normal.      Palpations: Abdomen is soft.      Tenderness: There is no abdominal tenderness.   Musculoskeletal:         General: No tenderness.      Cervical back: Neck supple. No tenderness.      Right lower leg: No edema.      Left lower leg: No edema.   Lymphadenopathy:      Cervical: No cervical adenopathy.   Skin:     General: Skin is warm and dry.      Coloration: Skin is pale.      Findings: No erythema.   Neurological:      Mental Status: He is alert and oriented to person, place, and time.      Sensory: No sensory deficit.      Motor: No weakness.   Psychiatric:         Behavior: Behavior normal.         Thought Content: Thought content normal.         Judgment: Judgment normal.         Laboratory:  Recent Labs     09/05/21 2002   WBC 9.6   RBC 6.01   HEMOGLOBIN 16.7   HEMATOCRIT 47.9   MCV 79.7*   MCH 27.8   MCHC 34.9   RDW 36.9   PLATELETCT 252   MPV 9.8     Recent Labs      09/05/21 2002   SODIUM 133*   POTASSIUM 3.7   CHLORIDE 96   CO2 21   GLUCOSE 131*   BUN 11   CREATININE 0.99   CALCIUM 9.0     Recent Labs     09/05/21 2002   ALTSGPT 30   ASTSGOT 37   ALKPHOSPHAT 151*   TBILIRUBIN 0.4   GLUCOSE 131*         No results for input(s): NTPROBNP in the last 72 hours.      No results for input(s): TROPONINT in the last 72 hours.    Imaging:  DX-CHEST-PORTABLE (1 VIEW)   Final Result      Bilateral Covid pneumonia        I have personally reviewed the patient's chest x-ray.  Bilateral basilar interstitial infiltrates consistent with COVID-19 infection.      I have personally reviewed the EKG.  Per my read, normal sinus rhythm with heart rate 77, QTc 412, no significant ST elevation or depression.         Assessment/Plan:  I anticipate this patient will require at least two midnights for appropriate medical management, necessitating inpatient admission.    * Acute respiratory failure due to COVID-19 (HCC)- (present on admission)  Assessment & Plan  Patient presents with worsening hypoxia and tachypnea secondary to COVID-19 pneumonia.  Chest x-rays showing diffuse interstitial infiltrates consistent with COVID-19.    I discussed the case with Dr. Marques, the ER physician.    Admit patient to medical floor patient status.  Continue oxygen as per respiratory protocol to maintain SPO2 greater than 90%.  Continuous pulse oximetry monitoring.  Respiratory therapy consult.    Incentive spirometry.  Furosemide 20 mg IV once now.  Dexamethasone 10 mg daily for 10 days.    Hyponatremia- (present on admission)  Assessment & Plan  Mild.    Check serum osmolality, urine osmolality, urine creatine, and urine sodium.  Continue to monitor.    Full code status- (present on admission)  Assessment & Plan  Discussed CODE STATUS with the patient and wife.  Patient requests to be full code and would like to be intubated if he deteriorates.  CPR also okay.    Refusal of blood transfusions as patient is Jehovah's  Witness- (present on admission)  Assessment & Plan  Patient requesting not to be transfused with blood products per his beliefs.  No transfusion indicated at this time.    Continue to monitor.      VTE prophylaxis: SCDs/TEDs and enoxaparin ppx

## 2021-09-06 NOTE — ED NOTES
Pt amb in halls, sats stayed at 90-92% without oxygen, however pt reports dizziness and had continuous coughing fit after ambulation. Will continue to monitor

## 2021-09-06 NOTE — ED TRIAGE NOTES
"Chief Complaint   Patient presents with   • Shortness of Breath     symptoms started on 09/01, pt recieved the results and he is COVID POSITIVE. C/O of low appetite, cough, fevers, back pain.     Patient reports to have been having difficulty breathing. Reports it feels \"tight.\" GCS 15.    Pt is alert and oriented, speaking in full sentences, follows commands and responds appropriately to questions. NAD. Resp are even and unlabored.      Pt placed in Senior Lounge. Pt educated on triage process. Pt encouraged to alert staff for any changes.     Patient and staff wearing appropriate PPE    /81   Pulse 97   Temp 36.6 °C (97.9 °F) (Temporal)   Resp (!) 22   Ht 1.854 m (6' 1\")   Wt 95.9 kg (211 lb 6.7 oz)   SpO2 92%   BMI 27.89 kg/m²   "

## 2021-09-06 NOTE — ED NOTES
Pt medicated for nausea. Wife at bedside, very agitated with staff. Explained situation to pt and family, sts understanding. ERP updated, pt sats ranging from 88-91% at rest. Tech at bedside to do a monitored ambulation.

## 2021-09-06 NOTE — ASSESSMENT & PLAN NOTE
Patient requesting not to be transfused with blood products per his beliefs.  No transfusion indicated at this time.    Continue to monitor.

## 2021-09-06 NOTE — ED PROVIDER NOTES
"ED Provider Note    CHIEF COMPLAINT  Chief Complaint   Patient presents with   • Shortness of Breath     symptoms started on 09/01, pt recieved the results and he is COVID POSITIVE. C/O of low appetite, cough, fevers, back pain.       HPI  Florin Cox is a 40 y.o. male who presents with shortness of breath and cough.  Symptoms started on 8/31 he was tested for Covid and it was positive.  He has a low appetite, cough, fevers, body aches., Change in smell and taste.  He has not been vaccinated.  His 17-year-old son who also was not vaccinated had Covid and then the patient's wife came down with Covid and now the patient has Covid.  No known prior significant medical history.    REVIEW OF SYSTEMS  See HPI for further details. All other systems are negative.     PAST MEDICAL HISTORY       SOCIAL HISTORY  Social History     Tobacco Use   • Smoking status: Never Smoker   • Smokeless tobacco: Never Used   Vaping Use   • Vaping Use: Never used   Substance and Sexual Activity   • Alcohol use: No   • Drug use: No   • Sexual activity: Not on file       SURGICAL HISTORY   has a past surgical history that includes other abdominal surgery.    CURRENT MEDICATIONS  Home Medications     Reviewed by Laila Muñiz (Pharmacy Tech) on 09/05/21 at 2219  Med List Status: Complete   Medication Last Dose Status   acetaminophen (TYLENOL) 325 MG Tab 9/5/2021 Active                ALLERGIES  No Known Allergies    PHYSICAL EXAM  VITAL SIGNS: /83   Pulse 77   Temp 37.4 °C (99.4 °F) (Temporal)   Resp (!) 32   Ht 1.854 m (6' 1\")   Wt 95.9 kg (211 lb 6.7 oz)   SpO2 94%   BMI 27.89 kg/m²   Pulse ox interpretation: I interpret this pulse ox as normal.  Constitutional: Alert in no apparent distress.  Active dry cough.  HENT: No signs of trauma, Bilateral external ears normal, Nose normal.   Eyes: Pupils are equal and reactive, Conjunctiva normal, Non-icteric.   Neck: Normal range of motion, No tenderness, Supple, No stridor. "   Cardiovascular: Regular rate and rhythm.   Thorax & Lungs: Bilateral rales, No respiratory distress, No wheezing, No chest tenderness.   Abdomen: Bowel sounds normal, Soft, No tenderness, No masses, No pulsatile masses. No peritoneal signs.  Skin: Warm, Dry, No erythema, No rash.   Back: No bony tenderness, No CVA tenderness.   Extremities: Intact distal pulses, No edema, No tenderness, No cyanosis  Musculoskeletal: Good range of motion in all major joints. No major deformities noted.   Neurologic: Alert, No focal deficits noted.       DIAGNOSTIC STUDIES / PROCEDURES    EKG - Physician interpretation  Results for orders placed or performed during the hospital encounter of 21   EKG   Result Value Ref Range    Report       [facility] Emergency Dept.    Test Date:  2021  Pt Name:    JOSE LUIS PUTNAM                  Department: ER  MRN:        [id number]                      Room:  Gender:     Male                         Technician: [id number]  :        1980                   Requested By:ER TRIAGE PROTOCOL  Order #:    [id number]                    Reading MD: MARTA LO MD    Measurements  Intervals                                Axis  Rate:       77                           P:          46  CT:         143                          QRS:        56  QRSD:       93                           T:          0  QT:         363  QTc:        412    Interpretive Statements  Sinus rhythm  Borderline T abnormalities, inferior leads  No previous ECG available for comparison  Electronically Signed On 2021 22:31:27 PDT by MARTA LO MD         LABS  Labs Reviewed   CBC WITH DIFFERENTIAL - Abnormal; Notable for the following components:       Result Value    MCV 79.7 (*)     Neutrophils-Polys 78.90 (*)     Lymphocytes 16.30 (*)     Neutrophils (Absolute) 7.54 (*)     All other components within normal limits   COMP METABOLIC PANEL - Abnormal; Notable for the following components:    Sodium 133 (*)   "   Glucose 131 (*)     Alkaline Phosphatase 151 (*)     Globulin 4.0 (*)     All other components within normal limits   LACTIC ACID   BLOOD CULTURE    Narrative:     Per Hospital Policy: Only change Specimen Src: to \"Line\" if  specified by physician order.   BLOOD CULTURE    Narrative:     Per Hospital Policy: Only change Specimen Src: to \"Line\" if  specified by physician order.   ESTIMATED GFR   LACTIC ACID   LACTIC ACID   URINALYSIS   URINE CULTURE(NEW)         RADIOLOGY  DX-CHEST-PORTABLE (1 VIEW)   Final Result      Bilateral Covid pneumonia            COURSE & MEDICAL DECISION MAKING    Medications   dexamethasone (DECADRON) tablet 6 mg (has no administration in time range)   guaiFENesin ER (MUCINEX) tablet 600 mg (has no administration in time range)   enoxaparin (LOVENOX) inj 40 mg (has no administration in time range)   acetaminophen (Tylenol) tablet 650 mg (has no administration in time range)   senna-docusate (PERICOLACE or SENOKOT S) 8.6-50 MG per tablet 2 Tablet (2 Tablets Oral Refused 9/5/21 2315)     And   polyethylene glycol/lytes (MIRALAX) PACKET 1 Packet (has no administration in time range)     And   magnesium hydroxide (MILK OF MAGNESIA) suspension 30 mL (has no administration in time range)     And   bisacodyl (DULCOLAX) suppository 10 mg (has no administration in time range)   ondansetron (ZOFRAN) syringe/vial injection 4 mg (4 mg Intravenous Given 9/5/21 2138)   dexamethasone (DECADRON) injection 6 mg (6 mg Intravenous Given 9/5/21 2332)       Pertinent Labs & Imaging studies reviewed. (See chart for details)  40 y.o. male presents with shortness of breath and cough with a recent Covid positive results.  Has fevers, body aches.  He has active cough here and is slightly tachypneic.  He did arrive to the emergency department with a normal pulse oximetry however while here he developed some slight hypoxia to around 88% on room air.  He was placed on 2 L nasal cannula supplemental oxygen with " "improvement.  The patient does have bilateral rales on physical examination.  Chest x-ray is consistent with Covid pneumonia.    Patient will be hospitalized for further Covid care.  Patient has not been vaccinated.    As this is related to the Covid virus, patient was not started on antibiotics.  This is primarily a viral pneumonia without lobar opacities to suggest bacterial pneumonia.  No leukocytosis.  No active fevers.  No signs of sepsis.    The total critical care time on this patient is 35 minutes, resuscitating patient, speaking with admitting physician, and deciphering test results. This 35 minutes is exclusive of separately billable procedures.    /63   Pulse 69   Temp 37.8 °C (100.1 °F) (Temporal)   Resp (!) 24   Ht 1.854 m (6' 1\")   Wt 95.9 kg (211 lb 6.7 oz)   SpO2 92%   BMI 27.89 kg/m²       FINAL IMPRESSION  1. Pneumonia due to COVID-19 virus    2. Hypoxia            Electronically signed by: Jerry Marques M.D., 9/5/2021 9:10 PM    "

## 2021-09-06 NOTE — ASSESSMENT & PLAN NOTE
Discussed CODE STATUS with the patient and wife.  Patient requests to be full code and would like to be intubated if he deteriorates.  CPR also okay.

## 2021-09-06 NOTE — PROGRESS NOTES
[facility] Medicine Daily Progress Note    Date of Service  9/6/2021    Chief Complaint  Florin Cox is a 40 y.o. male admitted 9/5/2021 with   Chief Complaint   Patient presents with   • Shortness of Breath     symptoms started on 09/01, pt recieved the results and he is COVID POSITIVE. C/O of low appetite, cough, fevers, back pain.         Hospital Course  This 39-year-old male with a past medical significant for obesity presented to the ER on 9/5/121 with a complaint of shortness of breath.    Reports having symptoms 6 days ago including loss of taste, smell, fever, myalgia.  He also reports having nausea, vomiting, decreased p.o. intake, abdominal pain, diarrhea.      Patient reports that his child had COVID-19 vaccine, then his wife got sick; then patient got sick.     Upon presentation in ER, patient is noted to be hypoxic 88% on room air requiring 2 L of oxygen, tachycardic with heart rate of 104 tachypneic.  Chest x-ray showed right lateral interstitial infiltrates consistent with COVID-19 ;MCV at 79.7; Na 133  He is actually admitted to the hospital service for the management of acute hypoxic respiratory failure l secondary to COVID-19 pneumonia    Interval Problem Update  Acute events overnight, patient seen in bed, denied any complaint other than feeling tired and weak.    I have personally seen and examined the patient at bedside. I discussed the plan of care with patient and bedside RN.    Consultants/Specialty  none    Code Status  Full Code    Disposition  Patient is not medically cleared.   Anticipate discharge to to home with close outpatient follow-up.  I have placed the appropriate orders for post-discharge needs.    Review of Systems  Review of Systems   Constitutional: Positive for malaise/fatigue. Negative for chills, fever and weight loss.   HENT: Negative for congestion and sore throat.    Eyes: Negative for blurred vision.   Respiratory: Positive for cough and shortness of breath.  Negative for hemoptysis and sputum production.    Cardiovascular: Negative for chest pain, palpitations and orthopnea.   Gastrointestinal: Negative for abdominal pain, heartburn, nausea and vomiting.   Genitourinary: Negative for dysuria.   Musculoskeletal: Negative for myalgias.   Neurological: Negative for dizziness and headaches.   Psychiatric/Behavioral: Negative for depression. The patient is nervous/anxious.         Physical Exam  Temp:  [36.3 °C (97.4 °F)-37.8 °C (100.1 °F)] 36.3 °C (97.4 °F)  Pulse:  [] 70  Resp:  [14-32] 24  BP: (103-141)/(58-83) 125/74  SpO2:  [88 %-95 %] 91 %    Physical Exam  Vitals and nursing note reviewed.   Constitutional:       Appearance: Normal appearance.   HENT:      Head: Normocephalic and atraumatic.   Eyes:      Extraocular Movements: Extraocular movements intact.   Cardiovascular:      Rate and Rhythm: Normal rate.      Pulses: Normal pulses.   Pulmonary:      Effort: No respiratory distress.      Breath sounds: Rales present.   Abdominal:      General: There is no distension.      Palpations: Abdomen is soft.      Tenderness: There is no abdominal tenderness.   Musculoskeletal:      Cervical back: Neck supple.      Right lower leg: No edema.      Left lower leg: No edema.   Neurological:      Mental Status: He is alert and oriented to person, place, and time.   Psychiatric:         Mood and Affect: Mood normal.         Fluids  No intake or output data in the 24 hours ending 09/06/21 0724    Laboratory  Recent Labs     09/05/21 2002   WBC 9.6   RBC 6.01   HEMOGLOBIN 16.7   HEMATOCRIT 47.9   MCV 79.7*   MCH 27.8   MCHC 34.9   RDW 36.9   PLATELETCT 252   MPV 9.8     Recent Labs     09/05/21 2002 09/06/21  0514   SODIUM 133* 137   POTASSIUM 3.7 4.0   CHLORIDE 96 99   CO2 21 23   GLUCOSE 131* 170*   BUN 11 13   CREATININE 0.99 1.08   CALCIUM 9.0 8.7                   Imaging  DX-CHEST-PORTABLE (1 VIEW)   Final Result      Bilateral Covid pneumonia            Assessment/Plan  * Acute respiratory failure due to COVID-19 ([facility])- (present on admission)  Assessment & Plan  Patient presents with worsening hypoxia and tachypnea secondary to COVID-19 pneumonia.  Chest x-rays showing diffuse interstitial infiltrates consistent with COVID-19.    Provide supportive treatment for COVID-19 pneumonia including RT protocol, breathing treatment, Mucinex, Decadron   --Encourage self proning, continuing incentive spirometer      COVID-19  Assessment & Plan  Found to be COVID-19, for supportive treatment including isolation per protocol  RT protocol breathing treatment, Mucinex, Decadron  Titrate O2 as needed   Self proning    Full code status- (present on admission)  Assessment & Plan  Discussed CODE STATUS with the patient and wife.  Patient requests to be full code and would like to be intubated if he deteriorates.  CPR also okay.    Refusal of blood transfusions as patient is Yazdanism- (present on admission)  Assessment & Plan  Patient requesting not to be transfused with blood products per his beliefs.  No transfusion indicated at this time.    Continue to monitor.    Hyponatremia- (present on admission)  Assessment & Plan  Resolved       VTE prophylaxis: enoxaparin ppx    I have performed a physical exam and reviewed and updated ROS and Plan today (9/6/2021). In review of yesterday's note (9/5/2021), there are no changes except as documented above.

## 2021-09-06 NOTE — ED NOTES
Med rec updated and complete. Allergies reviewed.   Pt takes no prescription medications.      Home pharmacy [facility] [phone]      • acetaminophen (TYLENOL) 325 MG Tab Take 650 mg by mouth every 6 hours as needed for Fever.

## 2021-09-06 NOTE — ED NOTES
Attending physician from [phone] is Dr Al. After 12, please check who is listed at attending for questions, orders, updates.

## 2021-09-07 VITALS
HEART RATE: 60 BPM | HEIGHT: 73 IN | WEIGHT: 211.42 LBS | OXYGEN SATURATION: 95 % | BODY MASS INDEX: 28.02 KG/M2 | SYSTOLIC BLOOD PRESSURE: 122 MMHG | TEMPERATURE: 97.1 F | DIASTOLIC BLOOD PRESSURE: 75 MMHG | RESPIRATION RATE: 17 BRPM

## 2021-09-07 LAB
ALBUMIN SERPL BCP-MCNC: 3.7 G/DL (ref 3.2–4.9)
ALBUMIN/GLOB SERPL: 1 G/DL
ALP SERPL-CCNC: 131 U/L (ref 30–99)
ALT SERPL-CCNC: 32 U/L (ref 2–50)
ANION GAP SERPL CALC-SCNC: 14 MMOL/L (ref 7–16)
AST SERPL-CCNC: 39 U/L (ref 12–45)
BASOPHILS # BLD AUTO: 0.1 % (ref 0–1.8)
BASOPHILS # BLD: 0.02 K/UL (ref 0–0.12)
BILIRUB SERPL-MCNC: 0.4 MG/DL (ref 0.1–1.5)
BUN SERPL-MCNC: 17 MG/DL (ref 8–22)
CALCIUM SERPL-MCNC: 8.5 MG/DL (ref 8.5–10.5)
CHLORIDE SERPL-SCNC: 96 MMOL/L (ref 96–112)
CO2 SERPL-SCNC: 22 MMOL/L (ref 20–33)
CREAT SERPL-MCNC: 1.02 MG/DL (ref 0.5–1.4)
EOSINOPHIL # BLD AUTO: 0 K/UL (ref 0–0.51)
EOSINOPHIL NFR BLD: 0 % (ref 0–6.9)
ERYTHROCYTE [DISTWIDTH] IN BLOOD BY AUTOMATED COUNT: 36.7 FL (ref 35.9–50)
GLOBULIN SER CALC-MCNC: 3.6 G/DL (ref 1.9–3.5)
GLUCOSE SERPL-MCNC: 128 MG/DL (ref 65–99)
HCT VFR BLD AUTO: 44.5 % (ref 42–52)
HGB BLD-MCNC: 15.5 G/DL (ref 14–18)
IMM GRANULOCYTES # BLD AUTO: 0.09 K/UL (ref 0–0.11)
IMM GRANULOCYTES NFR BLD AUTO: 0.6 % (ref 0–0.9)
LYMPHOCYTES # BLD AUTO: 2.37 K/UL (ref 1–4.8)
LYMPHOCYTES NFR BLD: 16.9 % (ref 22–41)
MCH RBC QN AUTO: 27.9 PG (ref 27–33)
MCHC RBC AUTO-ENTMCNC: 34.8 G/DL (ref 33.7–35.3)
MCV RBC AUTO: 80 FL (ref 81.4–97.8)
MONOCYTES # BLD AUTO: 0.85 K/UL (ref 0–0.85)
MONOCYTES NFR BLD AUTO: 6.1 % (ref 0–13.4)
NEUTROPHILS # BLD AUTO: 10.67 K/UL (ref 1.82–7.42)
NEUTROPHILS NFR BLD: 76.3 % (ref 44–72)
NRBC # BLD AUTO: 0 K/UL
NRBC BLD-RTO: 0 /100 WBC
PLATELET # BLD AUTO: 292 K/UL (ref 164–446)
PMV BLD AUTO: 9.6 FL (ref 9–12.9)
POTASSIUM SERPL-SCNC: 3.5 MMOL/L (ref 3.6–5.5)
PROT SERPL-MCNC: 7.3 G/DL (ref 6–8.2)
RBC # BLD AUTO: 5.56 M/UL (ref 4.7–6.1)
SODIUM SERPL-SCNC: 132 MMOL/L (ref 135–145)
WBC # BLD AUTO: 14 K/UL (ref 4.8–10.8)

## 2021-09-07 PROCEDURE — 80053 COMPREHEN METABOLIC PANEL: CPT

## 2021-09-07 PROCEDURE — 700102 HCHG RX REV CODE 250 W/ 637 OVERRIDE(OP)

## 2021-09-07 PROCEDURE — 700102 HCHG RX REV CODE 250 W/ 637 OVERRIDE(OP): Performed by: STUDENT IN AN ORGANIZED HEALTH CARE EDUCATION/TRAINING PROGRAM

## 2021-09-07 PROCEDURE — A9270 NON-COVERED ITEM OR SERVICE: HCPCS

## 2021-09-07 PROCEDURE — 85025 COMPLETE CBC W/AUTO DIFF WBC: CPT

## 2021-09-07 PROCEDURE — 700111 HCHG RX REV CODE 636 W/ 250 OVERRIDE (IP): Performed by: STUDENT IN AN ORGANIZED HEALTH CARE EDUCATION/TRAINING PROGRAM

## 2021-09-07 PROCEDURE — 99239 HOSP IP/OBS DSCHRG MGMT >30: CPT | Performed by: STUDENT IN AN ORGANIZED HEALTH CARE EDUCATION/TRAINING PROGRAM

## 2021-09-07 PROCEDURE — 36415 COLL VENOUS BLD VENIPUNCTURE: CPT

## 2021-09-07 PROCEDURE — A9270 NON-COVERED ITEM OR SERVICE: HCPCS | Performed by: STUDENT IN AN ORGANIZED HEALTH CARE EDUCATION/TRAINING PROGRAM

## 2021-09-07 RX ORDER — BENZONATATE 100 MG/1
100 CAPSULE ORAL 3 TIMES DAILY PRN
Qty: 60 CAPSULE | Refills: 0 | Status: SHIPPED | OUTPATIENT
Start: 2021-09-07

## 2021-09-07 RX ORDER — DEXAMETHASONE 6 MG/1
6 TABLET ORAL DAILY
Qty: 6 TABLET | Refills: 0 | Status: SHIPPED | OUTPATIENT
Start: 2021-09-08 | End: 2021-09-14

## 2021-09-07 RX ADMIN — DEXAMETHASONE 6 MG: 4 TABLET ORAL at 05:29

## 2021-09-07 RX ADMIN — ENOXAPARIN SODIUM 40 MG: 40 INJECTION SUBCUTANEOUS at 05:29

## 2021-09-07 RX ADMIN — ACETAMINOPHEN 650 MG: 325 TABLET ORAL at 05:30

## 2021-09-07 RX ADMIN — Medication 2 TABLET: at 05:30

## 2021-09-07 ASSESSMENT — PAIN DESCRIPTION - PAIN TYPE: TYPE: ACUTE PAIN

## 2021-09-07 NOTE — RESPIRATORY CARE
REMOTE MONITORING PROGRAM by RESPIRATORY THERAPY  9/7/2021 at 2:07 PM by Keyla Corcoran, RRT     Patient currently being evaluated for Oxygen needs at home. RN will notify RPM team for initiation. We will standby and setup if/when its appropriate

## 2021-09-07 NOTE — PROGRESS NOTES
Pt arrived to unit via gurney. Pt ambulated with a steady gait to the bed. Pt's wife at bedside. No complaints of pain at this time. Pt on 3L O2 via NC.  connected. Pt oriented to unit room, and educated on use of call light for needs.

## 2021-09-07 NOTE — DISCHARGE PLANNING
Anticipated Discharge Disposition: home with Oxygen and home monitoring    Action: Reviewed in IDT rounds that pt to dc home today with home oxygen and home monitoring.   Voalte texted TAMIA Cardona to let CM know when home eval completed will then   Voalte text Dr Cannon to add home oxygen order.   Home monitoring order already in place.   Pt has [place] [employer] for insurance. Contracted with [employer] [employer].   Choice form to be faxed to Tabitha WEBB to send.   Once above is done, will voalte texted Resp therapy to arrange home monitoring.      Barriers to Discharge: await home oxygen delivery to bedside and arrangements by RT  To set up home monitoring.     Plan: care coordination to follow for dc planning needs.     Addendum/follow up-reviewed with TAMIA Bolaños via voalte and pt does not need oxygen for home, is sating greater than 88% with ambulattion.

## 2021-09-07 NOTE — PROGRESS NOTES
1 RN Skin Assessment completed.   Patient's risk of skin breakdown: Low    Devices in place and skin assessed under:   [x] Pulse Ox  [x] PIV [] Central Line [] SCDs []Purewick  [] Layne  []Condom Cath   [] BMS        []  Cortrak   []  Oxymask   [] Bipap    [x] Nasal Canula   [] N/A   [] Other(specify):     Right Ear  [x] WDL                or           [] Skin issue present (please provide assessment/description below)    Left Ear  [x] WDL                or           [] Skin issue present (please provide assessment/description below)    Right Elbow:  [x] WDL               or           [] Skin issue present (please provide assessment/description below)    Left Elbow:   [x] WDL                or           [] Skin issue present (please provide assessment/description below)    Coccyx/Buttocks:  [x] WDL                or           [] Skin issue present (please provide assessment/description below)    Right Heel/Foot/Toes:  [x] WDL                or           [] Skin issue present (please provide assessment/description below)    Left Heel/Foot/Toes:   [x] WDL              or           [] Skin issue present (please provide assessment/description below)      **Describe any other skin issues in areas not already listed from above list:   [x] N/A    Interventions In Place: NC W/Ear Foams and Pressure Redistribution Mattress    **If any new or digressing skin issues are found, fill out the selection below.    Possible Skin Injury No  Pictures Uploaded Into Epic: N/A  Wound Consult Placed: N/A  RN Wound Prevention Protocol Ordered: No    What new preventative interventions did you add? N/A

## 2021-09-07 NOTE — PROGRESS NOTES
4 Eyes Skin Assessment Completed by Dulce RN and RASHEEDA Medrano.    Head WDL  Ears WDL  Nose WDL  Mouth WDL  Neck WDL  Breast/Chest WDL  Shoulder Blades WDL  Spine WDL  (R) Arm/Elbow/Hand WDL  (L) Arm/Elbow/Hand WDL  Abdomen WDL  Groin WDL  Scrotum/Coccyx/Buttocks WDL  (R) Leg WDL  (L) Leg WDL  (R) Heel/Foot/Toe WDL  (L) Heel/Foot/Toe WDL          Devices In Places Nasal Cannula      Interventions In Place Pillows    Possible Skin Injury No    Pictures Uploaded Into Epic N/A  Wound Consult Placed N/A  RN Wound Prevention Protocol Ordered No  Upon admission to the floor patient was informed how we do 2 RN Skin checks to make sure there are no skin issues. Pt gave this RN consent to perform a skin check.

## 2021-09-07 NOTE — DISCHARGE SUMMARY
Discharge Summary    CHIEF COMPLAINT ON ADMISSION  Chief Complaint   Patient presents with   • Shortness of Breath     symptoms started on 09/01, pt recieved the results and he is COVID POSITIVE. C/O of low appetite, cough, fevers, back pain.       Reason for Admission  covid symptoms     Admission Date  9/5/2021    CODE STATUS  Full Code    HPI & HOSPITAL COURSE  This is a 40 y.o. male admitted on 9/5/2021 with symptoms of COVID-19 pneumonia.  He was treated in the hospital with IV steroids, diuretics and DVT prophylaxis with improvement in his overall symptoms. His oxygen requirement was weaned down to room air prior to discharge from the hospital.    Therefore, he is discharged in fair and stable condition to home with close outpatient follow-up.    The patient met 2-midnight criteria for an inpatient stay at the time of discharge.    Discharge Date  9/7/2021    FOLLOW UP ITEMS POST DISCHARGE  None    DISCHARGE DIAGNOSES  Principal Problem:    Acute respiratory failure due to COVID-19 (HCC) POA: Yes  Active Problems:    Hyponatremia POA: Yes    Refusal of blood transfusions as patient is [employer] POA: Yes    Full code status POA: Yes    COVID-19 POA: Unknown  Resolved Problems:    * No resolved hospital problems. *      FOLLOW UP  No future appointments.  No follow-up provider specified.    MEDICATIONS ON DISCHARGE     Medication List      START taking these medications      Instructions   benzonatate 100 MG Caps  Commonly known as: TESSALON   Take 1 Capsule by mouth 3 times a day as needed for Cough.  Dose: 100 mg     dexamethasone 6 MG Tabs  Start taking on: September 8, 2021  Commonly known as: DECADRON   Take 1 Tablet by mouth every day for 6 days.  Dose: 6 mg        CONTINUE taking these medications      Instructions   acetaminophen 325 MG Tabs  Commonly known as: Tylenol   Take 650 mg by mouth every 6 hours as needed for Fever.  Dose: 650 mg            Allergies  Allergies   Allergen Reactions   •  Bloodless      Orthodoxy       DIET  Orders Placed This Encounter   Procedures   • Diet Order Diet: Regular     Standing Status:   Standing     Number of Occurrences:   1     Order Specific Question:   Diet:     Answer:   Regular [1]       ACTIVITY  As tolerated.  Weight bearing as tolerated    CONSULTATIONS  None    PROCEDURES  None    LABORATORY  Lab Results   Component Value Date    SODIUM 132 (L) 09/07/2021    POTASSIUM 3.5 (L) 09/07/2021    CHLORIDE 96 09/07/2021    CO2 22 09/07/2021    GLUCOSE 128 (H) 09/07/2021    BUN 17 09/07/2021    CREATININE 1.02 09/07/2021        Lab Results   Component Value Date    WBC 14.0 (H) 09/07/2021    HEMOGLOBIN 15.5 09/07/2021    HEMATOCRIT 44.5 09/07/2021    PLATELETCT 292 09/07/2021        Total time of the discharge process exceeds 35 minutes.

## 2021-09-07 NOTE — CARE PLAN
The patient is Watcher - Medium risk of patient condition declining or worsening    Shift Goals  Clinical Goals: Pts O2 sats will remain > 90%    Progress made toward(s) clinical / shift goals:  Pt on 3L O2 via NC and satting 93%.     Patient is not progressing towards the following goals:

## 2021-09-07 NOTE — CARE PLAN
The patient is Stable - Low risk of patient condition declining or worsening    Shift Goals  Clinical Goals: Pts O2 sats will remain > 90%    Progress made toward(s) clinical / shift goals:    Problem: Pain - Standard  Goal: Alleviation of pain or a reduction in pain to the patient’s comfort goal  Outcome: Progressing     Problem: Knowledge Deficit - Standard  Goal: Patient and family/care givers will demonstrate understanding of plan of care, disease process/condition, diagnostic tests and medications  Outcome: Progressing       Patient is not progressing towards the following goals:

## 2021-09-10 LAB
BACTERIA BLD CULT: NORMAL
BACTERIA BLD CULT: NORMAL
SIGNIFICANT IND 70042: NORMAL
SIGNIFICANT IND 70042: NORMAL
SITE SITE: NORMAL
SITE SITE: NORMAL
SOURCE SOURCE: NORMAL
SOURCE SOURCE: NORMAL

## 2023-08-04 ENCOUNTER — NON-PROVIDER VISIT (OUTPATIENT)
Dept: URGENT CARE | Facility: PHYSICIAN GROUP | Age: 43
End: 2023-08-04

## 2023-08-04 DIAGNOSIS — Z02.83 ENCOUNTER FOR DRUG SCREENING: ICD-10-CM

## 2023-08-04 DIAGNOSIS — Z02.1 PRE-EMPLOYMENT DRUG SCREENING: ICD-10-CM

## 2023-08-04 LAB
AMP AMPHETAMINE: NORMAL
COC COCAINE: NORMAL
INT CON NEG: NORMAL
INT CON POS: NORMAL
MET METHAMPHETAMINES: NORMAL
OPI OPIATES: NORMAL
PCP PHENCYCLIDINE: NORMAL
POC DRUG COMMENT 753798-OCCUPATIONAL HEALTH: NEGATIVE
THC: NORMAL

## 2023-08-04 PROCEDURE — 80305 DRUG TEST PRSMV DIR OPT OBS: CPT | Performed by: FAMILY MEDICINE

## 2024-07-31 NOTE — ASSESSMENT & PLAN NOTE
"Physical Therapy                 Therapy Communication Note    Patient Name: Enmanuel Bhatia  MRN: [id number]  Today's Date: 7/31/2024     Discipline: Physical Therapy    Missed Visit Reason: \"Too much going on\"    Missed Time: Cancel    Comment: last scheduled   " Found to be COVID-19, for supportive treatment including isolation per protocol  RT protocol breathing treatment, Mucinex, Decadron  Titrate O2 as needed   Self proning